# Patient Record
Sex: FEMALE
[De-identification: names, ages, dates, MRNs, and addresses within clinical notes are randomized per-mention and may not be internally consistent; named-entity substitution may affect disease eponyms.]

---

## 2017-08-30 ENCOUNTER — HOSPITAL ENCOUNTER (EMERGENCY)
Dept: HOSPITAL 14 - H.ER | Age: 22
Discharge: HOME | End: 2017-08-30
Payer: MEDICAID

## 2017-08-30 VITALS
TEMPERATURE: 98.6 F | HEART RATE: 72 BPM | RESPIRATION RATE: 20 BRPM | SYSTOLIC BLOOD PRESSURE: 133 MMHG | OXYGEN SATURATION: 98 % | DIASTOLIC BLOOD PRESSURE: 77 MMHG

## 2017-08-30 VITALS — BODY MASS INDEX: 32.3 KG/M2

## 2017-08-30 DIAGNOSIS — S87.80XA: ICD-10-CM
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
## 2017-08-30 DIAGNOSIS — S80.212A: Primary | ICD-10-CM
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
## 2017-08-30 NOTE — RAD
PROCEDURE:  Bilateral Knee Radiographs.



HISTORY:

pain



COMPARISON:

None.



FINDINGS:



BONES:

Right Knee:  Normal. No fracture. 



Left Knee:  Normal. No fracture. 



JOINTS:

Right Knee:  Normal. No osteoarthritis. 



Left knee: Normal. No osteoarthritis. 



SOFT TISSUES:

Right Knee: Normal.



Left Knee: Normal.



JOINT EFFUSION:

Right Knee: Questionable trace joint effusion. 



Left Knee: Suspect trace trace joint effusion.



OTHER FINDINGS:

None.



IMPRESSION:

No acute fractures.  See above discussion for additional details. If 

symptoms persist or occult fracture suspected clinically recommend 

repeat radiographs in 5-10 days as most fractures should become 

radiographically evident in this timeframe.  Alternatively, consider 

followup CT scan or MRI for further evaluation if clinically indicated
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
## 2018-01-20 ENCOUNTER — HOSPITAL ENCOUNTER (EMERGENCY)
Dept: HOSPITAL 14 - H.ER | Age: 23
Discharge: HOME | End: 2018-01-20
Payer: MEDICAID

## 2018-01-20 VITALS
OXYGEN SATURATION: 98 % | HEART RATE: 78 BPM | RESPIRATION RATE: 18 BRPM | SYSTOLIC BLOOD PRESSURE: 107 MMHG | TEMPERATURE: 98.6 F | DIASTOLIC BLOOD PRESSURE: 83 MMHG

## 2018-01-20 VITALS — BODY MASS INDEX: 32.3 KG/M2

## 2018-01-20 DIAGNOSIS — J45.909: ICD-10-CM

## 2018-01-20 DIAGNOSIS — J32.9: Primary | ICD-10-CM

## 2018-01-20 DIAGNOSIS — F41.9: ICD-10-CM
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
